# Patient Record
Sex: MALE | Race: WHITE
[De-identification: names, ages, dates, MRNs, and addresses within clinical notes are randomized per-mention and may not be internally consistent; named-entity substitution may affect disease eponyms.]

---

## 2018-12-31 ENCOUNTER — HOSPITAL ENCOUNTER (INPATIENT)
Dept: HOSPITAL 36 - ER | Age: 73
LOS: 4 days | Discharge: NURSING, REHAB OR LONG-TERM CARE | DRG: 689 | End: 2019-01-04
Payer: MEDICARE

## 2018-12-31 DIAGNOSIS — N40.0: ICD-10-CM

## 2018-12-31 DIAGNOSIS — F79: ICD-10-CM

## 2018-12-31 DIAGNOSIS — R13.10: ICD-10-CM

## 2018-12-31 DIAGNOSIS — M48.56XD: ICD-10-CM

## 2018-12-31 DIAGNOSIS — F03.90: ICD-10-CM

## 2018-12-31 DIAGNOSIS — F39: ICD-10-CM

## 2018-12-31 DIAGNOSIS — F41.9: ICD-10-CM

## 2018-12-31 DIAGNOSIS — F29: ICD-10-CM

## 2018-12-31 DIAGNOSIS — N39.0: Primary | ICD-10-CM

## 2018-12-31 DIAGNOSIS — I10: ICD-10-CM

## 2018-12-31 DIAGNOSIS — G93.41: ICD-10-CM

## 2018-12-31 DIAGNOSIS — F32.9: ICD-10-CM

## 2018-12-31 DIAGNOSIS — R00.0: ICD-10-CM

## 2018-12-31 LAB
ALBUMIN SERPL-MCNC: 3.7 GM/DL (ref 4.2–5.5)
ALBUMIN/GLOB SERPL: 1.3 {RATIO} (ref 1–1.8)
ALP SERPL-CCNC: 48 U/L (ref 34–104)
ALT SERPL-CCNC: 11 U/L (ref 7–52)
ANION GAP SERPL CALC-SCNC: 10.5 MMOL/L (ref 7–16)
APPEARANCE UR: CLEAR
AST SERPL-CCNC: 11 U/L (ref 13–39)
BACTERIA #/AREA URNS HPF: (no result) /HPF
BASOPHILS # BLD AUTO: 0 TH/CUMM (ref 0–0.2)
BASOPHILS NFR BLD AUTO: 0.2 % (ref 0–2)
BILIRUB SERPL-MCNC: 0.8 MG/DL (ref 0.3–1)
BILIRUB UR-MCNC: NEGATIVE MG/DL
BUN SERPL-MCNC: 16 MG/DL (ref 7–25)
CALCIUM SERPL-MCNC: 9.2 MG/DL (ref 8.6–10.3)
CHLORIDE SERPL-SCNC: 102 MEQ/L (ref 98–107)
CO2 SERPL-SCNC: 25.3 MEQ/L (ref 21–31)
COLOR UR: YELLOW
CREAT SERPL-MCNC: 0.7 MG/DL (ref 0.7–1.3)
EOSINOPHIL # BLD AUTO: 0.2 TH/CMM (ref 0.1–0.4)
EOSINOPHIL NFR BLD AUTO: 1.4 % (ref 0–5)
EPI CELLS URNS QL MICRO: (no result) /LPF
ERYTHROCYTE [DISTWIDTH] IN BLOOD BY AUTOMATED COUNT: 13.6 % (ref 11.5–20)
GLOBULIN SER-MCNC: 2.9 GM/DL
GLUCOSE SERPL-MCNC: 119 MG/DL (ref 70–105)
GLUCOSE UR STRIP-MCNC: NEGATIVE MG/DL
HCT VFR BLD CALC: 43.7 % (ref 41–60)
HGB BLD-MCNC: 14.5 GM/DL (ref 12–16)
INR PPP: 1 (ref 0.5–1.4)
KETONES UR STRIP-MCNC: NEGATIVE MG/DL
LEUKOCYTE ESTERASE UR-ACNC: NEGATIVE
LYMPHOCYTE AB SER FC-ACNC: 1.8 TH/CMM (ref 1.5–3)
LYMPHOCYTES NFR BLD AUTO: 14.3 % (ref 20–50)
MCH RBC QN AUTO: 28.6 PG (ref 27–31)
MCHC RBC AUTO-ENTMCNC: 33.3 PG (ref 28–36)
MCV RBC AUTO: 86.1 FL (ref 80–99)
MICRO URNS: YES
MONOCYTES # BLD AUTO: 0.9 TH/CMM (ref 0.3–1)
MONOCYTES NFR BLD AUTO: 7.6 % (ref 2–10)
NEUTROPHILS # BLD: 9.4 TH/CMM (ref 1.8–8)
NEUTROPHILS NFR BLD AUTO: 76.5 % (ref 40–80)
NITRITE UR QL STRIP: NEGATIVE
PH UR STRIP: 6.5 [PH] (ref 4.6–8)
PLATELET # BLD: 237 TH/CMM (ref 150–400)
PMV BLD AUTO: 6.6 FL
POTASSIUM SERPL-SCNC: 3.8 MEQ/L (ref 3.5–5.1)
PROT UR STRIP-MCNC: NEGATIVE MG/DL
PROTHROMBIN TIME: 10.4 SECONDS (ref 9.5–11.5)
RBC # BLD AUTO: 5.07 MIL/CMM (ref 3.8–5.8)
RBC # UR STRIP: NEGATIVE /UL
RBC #/AREA URNS HPF: (no result) /HPF (ref 0–5)
SODIUM SERPL-SCNC: 134 MEQ/L (ref 136–145)
SP GR UR STRIP: 1.01 (ref 1–1.03)
URINALYSIS COMPLETE PNL UR: (no result)
UROBILINOGEN UR STRIP-ACNC: 0.2 E.U./DL (ref 0.2–1)
WBC # BLD AUTO: 12.3 TH/CMM (ref 4.8–10.8)
WBC #/AREA URNS HPF: (no result) /HPF (ref 0–5)

## 2018-12-31 PROCEDURE — Z7610: HCPCS

## 2018-12-31 PROCEDURE — X3401: HCPCS

## 2018-12-31 RX ADMIN — DEXTROSE AND SODIUM CHLORIDE SCH MLS/HR: 5; .9 INJECTION, SOLUTION INTRAVENOUS at 23:17

## 2018-12-31 NOTE — HISTORY & PHYSICAL
ADMIT DATE:  12/31/2018



CHIEF COMPLAINT:  Worsening confusion and abdominal pain.



HISTORY OF PRESENT ILLNESS:  The patient is a 73-year-old male admitted from the

Emergency Room to telemetry floor of [facility] due to altered

level of consciousness with dysphagia and acute abdominal pain.  For the

abdominal pain, according to the ER physician, abdominal and pelvic CT scan

revealed no acute disease, but a significant partially enlargement.  However, I

do not have the official report of the CT scan yet.  The patient is very

confused, does not answer questions.  His white count is elevated to 12,300. 

The patient's sodium is 134.  Troponin less than 0.01.  TSH 1.83, which is

normal.  CT scan of the abdomen also revealed L2 compression fracture.



PAST MEDICAL HISTORY:  Including urinary tract infection, dysphagia, abnormal

posture, weakness, dementia, depression, anxiety.



PAST SURGICAL HISTORY:  This cannot be reliably obtained as the patient does not

answer questions.



MEDICATIONS:  See medication reconciliation list.



ALLERGIES:  No known drug allergies.



REVIEW OF SYSTEMS:  Not feasible.



PHYSICAL EXAMINATION:

GENERAL:  Well-developed female in no acute distress.

SKIN:  Warm.

VITAL SIGNS:  Basically stable.

HEENT:  Normocephalic, atraumatic.  Pupils equal, round, react to light and

accommodation.

CHEST:  Symmetrical.

LUNGS:  Clear to auscultation bilaterally.

CARDIAC:  Normal sinus rhythm.

ABDOMEN:  Benign, soft, nontender.

EXTREMITIES:  No clubbing, cyanosis, edema bilaterally, 2+.

NEUROLOGIC:  Unremarkable.



LABORATORY DATA:  Reviewed as seen from the computer.



ASSESSMENT AND PLAN:

1.  Altered level of consciousness:  Probably due to metabolic encephalopathy

and dementia.  We will observe closely.

2.  Abdominal pain:  Etiology is not entirely clear.  We will observe closely.

3.  Leukocytosis:  Blood culture ordered, empiric antibiotics started, which

will be adjusted accordingly.

4.  Dysphagia:  Swallowing evaluation ordered and aspiration precaution.

5.  L2 compression fracture:  Physical therapy.

6.  DVT prophylaxis.





DD: 12/31/2018 21:35

DT: 12/31/2018 21:50

JOB# [id number]  [id number]

## 2018-12-31 NOTE — ED PHYSICIAN CHART
ED Chief Complaint/HPI





- Patient Information


Date Seen:: 12/31/18


Time Seen:: 13:14


Chief Complaint:: abdominal pain


History of Present Illness:: 





this is a 73 yr old male who is here from a nursing home sent here for 

abdominal pain. He is mentally challenged and cannot understand anything that 

is said.


Allergies:: 


 Allergies











Allergy/AdvReac Type Severity Reaction Status Date / Time


 


No Known Allergies Allergy   Verified 12/31/18 13:03











Vitals:: 


 Vital Signs - 8 hr











  12/31/18





  13:03


 


Temp 97.5 F


 


HR 80


 


RR 18


 


/74


 


O2 Sat % 99











Historian:: Medical Records


Review:: Nurse's Note Reviewed, Transfer documents Reviewed





ED Review of Systems





- Review of Systems


General/Constitutional: No fever, No chills, No weight loss, No weakness, No 

diaphoresis, No edema, No loss of appetite, Other (this patient is unable to 

give a review of systems)


Skin: No skin lesions, No rash, No bruising


Head: No headache, No light-headedness


Eyes: No loss of vision, No pain, No diplopia


ENT: No earache, No nasal drainage, No sore throat, No tinnitus


Neck: No neck pain, No swelling, No thyromegaly, No stiffness, No mass noted


Cardio Vascular: No chest pain, No palpitations, No PND, No orthopnea, No edema


Pulmonary: No SOB, No cough, No sputum, No wheezing


GI: No nausea, No vomiting, No diarrhea, No pain, No melena, No hematochezia, 

No constipation, No hematemesis


G/U: No dysuria, No frequency, No hematuria


Musculoskeletal: No bone or joint pain, No back pain, No muscle pain


Endocrine: No polyuria, No polydipsia


Psychiatric: No prior psych history, No depression, No anxiety, No suicidal 

ideation


Hematopoietic: No bruising, No lymphadenopathy


Allergic/Immuno: No urticaria, No angioedema


Neurological: No syncope, No focal symptoms, No weakness, No paresthesia, No 

headache, No seizure, No dizziness, No confusion, No vertigo





ED Past Medical History





- Past Medical History


Obtainable: Yes


Past Medical History: HTN, Dementia


Family History: None


Social History: Non Smoker, No Alcohol, No Drug Use, , Care Facility


Surgical History: None


Psychiatricy History: Depression


Medication: Reviewed





Family Medical History





- Family Member


  ** Mother


History Unknown: Yes





ED Physical Exam





- Physical Examination


General/Constitutional: Awake, Well-developed, well-nourished, Alert, No 

distress, GCS 15, Non-toxic appearing, Ambulatory


Head: Atraumatic


Eyes: Lids, conjuctiva normal, PERRL, EOMI


Skin: Nl inspection, No rash, No skin lesions, No ecchymosis, Well hydrated, No 

lymphadenopathy


ENMT: External ears, nose nl, Nasal exam nl, Lips, teeth, gums nl


Neck: Nontender, Full ROM w/o pain, No JVD, No nuchal rigidity, No bruit, No 

mass, No stridor


Respiratory: Nl effort/Exclusion, Clear to Auscultation, No Wheeze/Rhonchi/Rales


Cardio Vascular: RRR, No murmur, gallop, rubs, NL S1 S2


GI: No tenderness/rebounding/guarding, No organomegaly, No hernia, Normal BS's, 

Nondistended, No mass/bruits, No McBurney tenderness


: No CVA tenderness


Extremities: No tenderness or effusion, Full ROM, normal strength in all 

extremities, No edema, Normal digits & nails


Neuro/Psych: Alert/oriented, DTR's symmetric, Normal sensory exam, Normal motor 

strength, Judgement/insight normal, Mood normal, Normal gait, No focal deficits


Misc: Normal back, No paraspinal tenderness





ED Labs/Radiology/EKG Results





- Lab Results


Results: 





 Laboratory Results - last 24 hr











  12/31/18 12/31/18 12/31/18





  13:00 13:00 13:00


 


WBC   12.3 H 


 


RBC   5.07 


 


Hgb   14.5 


 


Hct   43.7 


 


MCV   86.1 


 


MCH   28.6 


 


MCHC Differential   33.3 


 


RDW   13.6 


 


Plt Count   237 


 


MPV   6.6 


 


Neutrophils %   76.5 


 


Lymphocytes %   14.3 L 


 


Monocytes %   7.6 


 


Eosinophils %   1.4 


 


Basophils %   0.2 


 


PT  10.4  


 


INR  1.00  


 


Sodium    134 L


 


Potassium    3.8


 


Chloride    102


 


Carbon Dioxide    25.3


 


Anion Gap    10.5


 


BUN    16


 


Creatinine    0.7


 


Est GFR ( Amer)    TNP


 


Est GFR (Non-Af Amer)    TNP


 


BUN/Creatinine Ratio    22.9


 


Glucose    119 H


 


Calcium    9.2


 


Total Bilirubin    0.8


 


AST    11 L


 


ALT    11


 


Alkaline Phosphatase    48


 


Troponin I   


 


Total Protein    6.6


 


Albumin    3.7 L


 


Globulin    2.9


 


Albumin/Globulin Ratio    1.3


 


TSH   














  12/31/18 12/31/18





  13:00 13:00


 


WBC  


 


RBC  


 


Hgb  


 


Hct  


 


MCV  


 


MCH  


 


MCHC Differential  


 


RDW  


 


Plt Count  


 


MPV  


 


Neutrophils %  


 


Lymphocytes %  


 


Monocytes %  


 


Eosinophils %  


 


Basophils %  


 


PT  


 


INR  


 


Sodium  


 


Potassium  


 


Chloride  


 


Carbon Dioxide  


 


Anion Gap  


 


BUN  


 


Creatinine  


 


Est GFR ( Amer)  


 


Est GFR (Non-Af Amer)  


 


BUN/Creatinine Ratio  


 


Glucose  


 


Calcium  


 


Total Bilirubin  


 


AST  


 


ALT  


 


Alkaline Phosphatase  


 


Troponin I  < 0.01 L 


 


Total Protein  


 


Albumin  


 


Globulin  


 


Albumin/Globulin Ratio  


 


TSH   1.83














- Radiology Results


Results: 





ct scan of abdomen = severely enlarged prostate gland


                              L2 compression fracture


 chest x-ray  =  nad








- EKG Interpretations


EKG Time:: 13:08


Rate & Rhythm: ratee= 68 ,  sinus


Axis: left axis


Intervals: no ectopy noted





ED Assessment





- Assessment


General Assessment: 





abdominal pain


prostate disease


back pain





ED Septic Shock





- .


Is Septic Shock (SBP<90, OR Lactate>4 mmol\L) present?: No





- <6hrs of presentation:


Vital Signs: 


 Vital Signs - 8 hr











  12/31/18





  13:03


 


Temp 97.5 F


 


HR 80


 


RR 18


 


/74


 


O2 Sat % 99














ED Reassessment (Disposition)





- Reassessment


Reassessment Condition:: Unchanged





- Diagnosis


Diagnosis:: 





acute abdominal pain


dementia


severe prostate disease


L2 compression fracture





- Patient Disposition


Discharge/Transfer:: Acute Care w/in this hosp


Admitting Medical Physician:: Nato Portillo


Condition at Disposition:: Improved

## 2019-01-01 RX ADMIN — Medication SCH EACH: at 16:58

## 2019-01-01 RX ADMIN — Medication SCH: at 12:47

## 2019-01-01 RX ADMIN — DEXTROSE AND SODIUM CHLORIDE SCH MLS/HR: 5; .9 INJECTION, SOLUTION INTRAVENOUS at 15:40

## 2019-01-01 NOTE — DIAGNOSTIC IMAGING REPORT
Exam: CT examination abdomen pelvis.



HISTORY: Abdominal pain



Total DLP equals 586



CTDI equals 10.8



Findings:



Multiple contiguous thin section of the abdomen pelvis obtained from

lower thorax to pubic symphysis without the administration of oral or

intravenous contrast material, no prior studies available comparison.



The study demonstrates a mild motion artifacts.



Liver and spleen are intact.  The gallbladder is normal.  The kidneys

demonstrate no evidence of obstructive uropathy or nephrolithiasis. 

Small vascular calcification right kidney appreciated



Abdominal aorta is calcified.  There is evidence of for enlarged

prostate gland protruding into urinary bladder.  Degenerative changes

lumbar spine with the compression fracture of L2 lumbar vertebra

appreciated most likely chronic.



There is no evidence for lytic or blastic lesions.



IMPRESSION: Large prostate gland protruding to urinary bladder using the

bladder wall is thickened.



Degenerative changes lumbar spine with L2 lumbar vertebra compression.

## 2019-01-01 NOTE — CONSULTATION
DATE OF CONSULTATION:  01/01/2019



HISTORY OF PRESENT ILLNESS:  A 73-year-old male coming in from skilled nursing. 

Apparently mentally challenged, is not really able to understand much. The

patient is not answering any questions, not even his name, does not know where

he is, does not know the year, does not know the month.  Per staff, he is trying

to hit staff, agitated, aggressive, highly impulsive.



PAST PSYCHIATRIC HISTORY:  Developmental disability.



FAMILY HISTORY:  Noncontributory.



SOCIAL HISTORY:  Coming in from skilled nursing.  Daughter involved.



MEDICATIONS:  Noted.



MENTAL STATUS EXAMINATION:  Stated age.  Fair eye contact.  Awake, alert, but

very confused, disoriented, not answering any questions appropriately.  No overt

SI or HI, unclear psychotic symptoms.  Poor impulse control.



PROVISIONAL DIAGNOSES:  Developmental disability.  The patient is on Aricept,

possible dementia. Diagnosis also psychosis, unspecified; mood, unspecified;

anxiety, unspecified.



MEDICAL:  Please see full H and P.



RECOMMENDATIONS AND PLAN:  We will attempt to increase collateral.  The patient

may benefit from dosing of Depakote.  The patient may need Geropsych placement

if behaviors persist.





DD: 01/01/2019 10:34

DT: 01/01/2019 17:23

JOB# [id number]  [id number]

## 2019-01-02 RX ADMIN — Medication SCH TAB: at 09:15

## 2019-01-02 RX ADMIN — DEXTROSE AND SODIUM CHLORIDE SCH MLS/HR: 5; .9 INJECTION, SOLUTION INTRAVENOUS at 18:28

## 2019-01-02 RX ADMIN — DEXTROSE AND SODIUM CHLORIDE SCH MLS/HR: 5; .9 INJECTION, SOLUTION INTRAVENOUS at 04:55

## 2019-01-02 RX ADMIN — Medication SCH EACH: at 09:15

## 2019-01-02 NOTE — INTERNAL MEDICINE PROG NOTE
Internal Medicine Subjective





- Subjective


Service Date: 01/02/19


Patient seen and examined:: without staff


Patient is:: awake, non-verbal, non-interactive, in bed


Per staff patient has:: no adverse event





Internal Medicine Objective





- Results


Result Diagrams: 


 12/31/18 13:00





 12/31/18 13:00


Recent Labs: 


 Laboratory Last Values











WBC  12.3 Th/cmm (4.8-10.8)  H  12/31/18  13:00    


 


RBC  5.07 Mil/cmm (3.80-5.80)   12/31/18  13:00    


 


Hgb  14.5 gm/dL (12-16)   12/31/18  13:00    


 


Hct  43.7 % (41.0-60)   12/31/18  13:00    


 


MCV  86.1 fl (80-99)   12/31/18  13:00    


 


MCH  28.6 pg (27.0-31.0)   12/31/18  13:00    


 


MCHC Differential  33.3 pg (28.0-36.0)   12/31/18  13:00    


 


RDW  13.6 % (11.5-20.0)   12/31/18  13:00    


 


Plt Count  237 Th/cmm (150-400)   12/31/18  13:00    


 


MPV  6.6 fl  12/31/18  13:00    


 


Neutrophils %  76.5 % (40.0-80.0)   12/31/18  13:00    


 


Lymphocytes %  14.3 % (20.0-50.0)  L  12/31/18  13:00    


 


Monocytes %  7.6 % (2.0-10.0)   12/31/18  13:00    


 


Eosinophils %  1.4 % (0.0-5.0)   12/31/18  13:00    


 


Basophils %  0.2 % (0.0-2.0)   12/31/18  13:00    


 


PT  10.4 SECONDS (9.5-11.5)   12/31/18  13:00    


 


INR  1.00  (0.5-1.4)   12/31/18  13:00    


 


Sodium  134 mEq/L (136-145)  L  12/31/18  13:00    


 


Potassium  3.8 mEq/L (3.5-5.1)   12/31/18  13:00    


 


Chloride  102 mEq/L ()   12/31/18  13:00    


 


Carbon Dioxide  25.3 mEq/L (21.0-31.0)   12/31/18  13:00    


 


Anion Gap  10.5  (7.0-16.0)   12/31/18  13:00    


 


BUN  16 mg/dL (7-25)   12/31/18  13:00    


 


Creatinine  0.7 mg/dL (0.7-1.3)   12/31/18  13:00    


 


Est GFR ( Amer)  TNP   12/31/18  13:00    


 


Est GFR (Non-Af Amer)  TNP   12/31/18  13:00    


 


BUN/Creatinine Ratio  22.9   12/31/18  13:00    


 


Glucose  119 mg/dL ()  H  12/31/18  13:00    


 


Calcium  9.2 mg/dL (8.6-10.3)   12/31/18  13:00    


 


Total Bilirubin  0.8 mg/dL (0.3-1.0)   12/31/18  13:00    


 


AST  11 U/L (13-39)  L  12/31/18  13:00    


 


ALT  11 U/L (7-52)   12/31/18  13:00    


 


Alkaline Phosphatase  48 U/L ()   12/31/18  13:00    


 


Troponin I  < 0.01 ng/mL (0.01-0.05)  L  12/31/18  13:00    


 


Total Protein  6.6 gm/dL (6.0-8.3)   12/31/18  13:00    


 


Albumin  3.7 gm/dL (4.2-5.5)  L  12/31/18  13:00    


 


Globulin  2.9 gm/dL  12/31/18  13:00    


 


Albumin/Globulin Ratio  1.3  (1.0-1.8)   12/31/18  13:00    


 


TSH  1.83 uIU/ml (0.34-5.60)   12/31/18  13:00    


 


Urine Source  CLEAN C   12/31/18  15:30    


 


Urine Color  YELLOW   12/31/18  15:30    


 


Urine Clarity  CLEAR  (CLEAR)   12/31/18  15:30    


 


Urine pH  6.5  (4.6 - 8.0)   12/31/18  15:30    


 


Ur Specific Gravity  1.010  (1.005-1.030)   12/31/18  15:30    


 


Urine Protein  NEGATIVE mg/dL (NEGATIVE)   12/31/18  15:30    


 


Urine Glucose (UA)  NEGATIVE mg/dL (NEGATIVE)   12/31/18  15:30    


 


Urine Ketones  NEGATIVE mg/dL (NEGATIVE)   12/31/18  15:30    


 


Urine Blood  NEGATIVE  (NEGATIVE)   12/31/18  15:30    


 


Urine Nitrate  NEGATIVE  (NEGATIVE)   12/31/18  15:30    


 


Urine Bilirubin  NEGATIVE  (NEGATIVE)   12/31/18  15:30    


 


Urine Urobilinogen  0.2 E.U./dL (0.2 - 1.0)   12/31/18  15:30    


 


Ur Leukocyte Esterase  NEGATIVE  (NEGATIVE)   12/31/18  15:30    


 


Urine RBC  0-2 /hpf (0-5)  H  12/31/18  15:30    


 


Urine WBC  2-5 /hpf (0-5)   12/31/18  15:30    


 


Ur Epithelial Cells  FEW /lpf (FEW)   12/31/18  15:30    


 


Urine Bacteria  FEW /hpf (NONE SEEN)   12/31/18  15:30    














- Physical Exam


Vitals and I&O: 


 Vital Signs











Temp  97.9 F   01/02/19 04:00


 


Pulse  69   01/02/19 04:00


 


Resp  18   01/02/19 04:00


 


BP  134/68   01/02/19 04:00


 


Pulse Ox  96   01/02/19 04:00








 Intake & Output











 01/01/19 01/02/19 01/02/19





 18:59 06:59 18:59


 


Intake Total 1000 1720 


 


Balance 1000 1720 


 


Weight (lbs)  70.806 kg 


 


Intake:   


 


  Intake, IV Amount 1000 1000 


 


    D5-0.9%Ns 1,000 ml @ 80 1000 1000 





    mls/hr IV .L78C06C Count includes the [facility] Rx   





    #:[id number]   


 


  Oral  720 


 


Other:   


 


  # Voids  1 


 


  # Bowel Movements  1 


 


  Stool Characteristics Liquid  





 Black  


 


  Weight Source  Bedscale 











Active Medications: 


Current Medications





Acetaminophen (Tylenol)  650 mg PO Q4HR PRN


   PRN Reason: Pain or Fever >101


   Stop: 03/01/19 21:26


   Last Admin: 01/01/19 20:23 Dose:  650 mg


Acetaminophen (Tylenol Extra Strength)  1,000 mg PO Q4HR PRN


   PRN Reason: Pain (Moderate) 4-7


   Stop: 03/01/19 21:26


Benztropine Mesylate (Cogentin)  0.5 mg PO BID Count includes the [facility]


   Stop: 03/02/19 08:59


   Last Admin: 01/02/19 09:15 Dose:  0.5 mg


Bisacodyl (Dulcolax 10 Mg Supp)  10 mg RC DAILY PRN


   PRN Reason: Constipation


   Stop: 03/01/19 21:26


Clonazepam (Klonopin)  0.5 mg PO HS Count includes the [facility]; Protocol


   Stop: 03/02/19 20:59


   Last Admin: 01/01/19 20:23 Dose:  0.5 mg


Docusate Sodium (Colace)  250 mg PO DAILY DARNELL


   Stop: 03/02/19 08:59


   Last Admin: 01/01/19 12:46 Dose:  Not Given


Donepezil HCl (Aricept)  5 mg PO Q12HR DARNELL


   Stop: 03/01/19 21:59


   Last Admin: 01/02/19 09:15 Dose:  5 mg


Heparin Sodium (Porcine) (Heparin)  5,000 units SUBQ Q12HR DARNELL


   Stop: 03/02/19 20:59


   Last Admin: 01/02/19 09:15 Dose:  5,000 units


Dextrose/Sodium Chloride (D5-0.9%Ns)  1,000 mls @ 80 mls/hr IV .L26C36O Count includes the [facility]


   Stop: 03/01/19 21:59


   Last Admin: 01/02/19 04:55 Dose:  80 mls/hr


Ceftriaxone Sodium 1 gm/ (Dextrose)  50 mls @ 100 mls/hr IV Q24HR Count includes the [facility]


   Stop: 03/02/19 12:59


   Last Admin: 01/01/19 13:02 Dose:  100 mls/hr


Influenza Virus Vaccine (Flu Ad)  0.5 ml IM .ONCE ONE


   Stop: 01/02/19 14:01


Lactobacillus Rhamnosus (Culturelle 15b)  1 each PO DAILY Count includes the [facility]


   Stop: 03/02/19 15:59


   Last Admin: 01/02/19 09:15 Dose:  1 each


Lorazepam (Ativan)  0.5 mg IVP Q6H PRN; Protocol


   PRN Reason: Agitation


   Stop: 03/02/19 12:09


   Last Admin: 01/02/19 09:52 Dose:  0.5 mg


Magnesium Hydroxide (Milk Of Magnesia)  30 ml PO HS DARNELL


   Stop: 03/02/19 20:59


   Last Admin: 01/01/19 20:28 Dose:  Not Given


Megestrol Acetate (Megace)  400 mg PO BID Count includes the [facility]


   Stop: 03/02/19 08:59


   Last Admin: 01/01/19 16:56 Dose:  400 mg


Memantine (Namenda)  5 mg PO Q12HR Count includes the [facility]


   Stop: 03/01/19 21:59


   Last Admin: 01/02/19 09:15 Dose:  5 mg


Mirtazapine (Remeron)   mg PO HS Count includes the [facility]; Protocol


   Stop: 03/02/19 20:59


Miscellaneous (Vte Chemical Prophylaxis Screen/ Admission)  1 ea MC PRN PRN


   PRN Reason: PROTOCOL


   Stop: 03/02/19 14:26


Miscellaneous (Probiotic Screen)  1 ea MC PRN PRN


   PRN Reason: PROTOCOL


   Stop: 03/02/19 15:31


Psyllium Hydrophilic Mucilloid (Metamucil)  1 pkt PO BID Count includes the [facility]


   Stop: 03/02/19 08:59


   Last Admin: 01/01/19 16:56 Dose:  1 pkt


Sodium Phosphate (Fleet Enema)  135 ml RC Q48H PRN


   PRN Reason: Constipation


   Stop: 03/01/19 21:26








General: weak, lethargic, demented


HEENT: NC/AT, PERRLA, anicteric sclerae


Neck: Supple, No JVD


Lungs: CTAB, congested


Cardiovascular: RRR, Normal S1, Normal S2


Abdomen: soft, non-distended


Extremities: clear





Internal Medicine Assmt/Plan





- Assessment


Assessment: 





ALOC: observe for now.





Leukocystosis: etiology? repeat CBC





Abd pain: better.





Enlarged prostate on CT: PSA ordered.





Agitation: observe





Psychosis: adjusting meds.





Dysphagia: ST and aspiration evaluation.

## 2019-01-03 LAB
ALBUMIN SERPL-MCNC: 2.7 GM/DL (ref 4.2–5.5)
ALBUMIN/GLOB SERPL: 0.9 {RATIO} (ref 1–1.8)
ALP SERPL-CCNC: 48 U/L (ref 34–104)
ALT SERPL-CCNC: 20 U/L (ref 7–52)
ANION GAP SERPL CALC-SCNC: 10.5 MMOL/L (ref 7–16)
AST SERPL-CCNC: 15 U/L (ref 13–39)
BASOPHILS # BLD AUTO: 0 TH/CUMM (ref 0–0.2)
BASOPHILS NFR BLD AUTO: 0.3 % (ref 0–2)
BILIRUB SERPL-MCNC: 1 MG/DL (ref 0.3–1)
BUN SERPL-MCNC: 5 MG/DL (ref 7–25)
CALCIUM SERPL-MCNC: 8.3 MG/DL (ref 8.6–10.3)
CHLORIDE SERPL-SCNC: 106 MEQ/L (ref 98–107)
CO2 SERPL-SCNC: 22 MEQ/L (ref 21–31)
CREAT SERPL-MCNC: 0.6 MG/DL (ref 0.7–1.3)
EOSINOPHIL # BLD AUTO: 0.6 TH/CMM (ref 0.1–0.4)
EOSINOPHIL NFR BLD AUTO: 5.6 % (ref 0–5)
ERYTHROCYTE [DISTWIDTH] IN BLOOD BY AUTOMATED COUNT: 13.2 % (ref 11.5–20)
GLOBULIN SER-MCNC: 3.1 GM/DL
GLUCOSE SERPL-MCNC: 125 MG/DL (ref 70–105)
HCT VFR BLD CALC: 39.6 % (ref 41–60)
HGB BLD-MCNC: 13.4 GM/DL (ref 12–16)
LYMPHOCYTE AB SER FC-ACNC: 1 TH/CMM (ref 1.5–3)
LYMPHOCYTES NFR BLD AUTO: 9.2 % (ref 20–50)
MCH RBC QN AUTO: 28.9 PG (ref 27–31)
MCHC RBC AUTO-ENTMCNC: 33.8 PG (ref 28–36)
MCV RBC AUTO: 85.5 FL (ref 80–99)
MONOCYTES # BLD AUTO: 1 TH/CMM (ref 0.3–1)
MONOCYTES NFR BLD AUTO: 9.4 % (ref 2–10)
NEUTROPHILS # BLD: 8.1 TH/CMM (ref 1.8–8)
NEUTROPHILS NFR BLD AUTO: 75.5 % (ref 40–80)
PLATELET # BLD: 213 TH/CMM (ref 150–400)
PMV BLD AUTO: 6.8 FL
POTASSIUM SERPL-SCNC: 3.5 MEQ/L (ref 3.5–5.1)
RBC # BLD AUTO: 4.63 MIL/CMM (ref 3.8–5.8)
SODIUM SERPL-SCNC: 135 MEQ/L (ref 136–145)
WBC # BLD AUTO: 10.7 TH/CMM (ref 4.8–10.8)

## 2019-01-03 RX ADMIN — Medication SCH EACH: at 09:35

## 2019-01-03 RX ADMIN — DILTIAZEM HYDROCHLORIDE SCH MG: 30 TABLET, FILM COATED ORAL at 12:49

## 2019-01-03 RX ADMIN — DEXTROSE AND SODIUM CHLORIDE SCH MLS/HR: 5; .9 INJECTION, SOLUTION INTRAVENOUS at 06:52

## 2019-01-03 RX ADMIN — DILTIAZEM HYDROCHLORIDE SCH MG: 30 TABLET, FILM COATED ORAL at 17:39

## 2019-01-03 RX ADMIN — DILTIAZEM HYDROCHLORIDE SCH: 30 TABLET, FILM COATED ORAL at 07:11

## 2019-01-03 RX ADMIN — DEXTROSE AND SODIUM CHLORIDE SCH MLS/HR: 5; .9 INJECTION, SOLUTION INTRAVENOUS at 21:49

## 2019-01-03 RX ADMIN — Medication SCH TAB: at 09:35

## 2019-01-03 NOTE — CONSULTATION
DATE OF CONSULTATION:  01/02/2019



HISTORY OF PRESENT ILLNESS:  A 73-year-old male coming from skilled nursing

[facility] with developmental disability, unable to even tell me his name.  Per

staff, noted to be restless, ongoing safety concerns.  Per , the

patient coming from [place].  The patient still needing restraints at times,

still unruly, not following unit rules or directions.  The patient biting and

pulling IV line.



PAST PSYCHIATRIC HISTORY:  Noted.



MENTAL STATUS EXAMINATION:  Stated age, AO to zero, restless, unruly, still

anxious, agitated, trying to bite staff.



Medications were noted.



PROVISIONAL DIAGNOSIS:  No change.  Developmental disability; mood, unspecified;

anxiety, unspecified; psychosis, unspecified.



RECOMMENDATIONS AND PLAN:  We will attempt to reach out to daughter.  We will

attempt to increase collateral.  Clarify diagnosis.  It is unclear if the

differential is correct perhaps possibly just dementia with behaviors.  It is

unclear if developmental disability is accurate.  The patient may need transfer

to [facility].





DD: 01/02/2019 11:45

DT: 01/03/2019 00:46

[facility]# [id number]  [id number]

## 2019-01-03 NOTE — INTERNAL MEDICINE PROG NOTE
Internal Medicine Subjective





- Subjective


Service Date: 01/03/19


Patient seen and examined:: without staff


Patient is:: awake, non-verbal, non-interactive, in bed


Per staff patient has:: no adverse event





Internal Medicine Objective





- Results


Result Diagrams: 


 01/03/19 05:45





 01/03/19 05:45


Recent Labs: 


 Laboratory Last Values











WBC  10.7 Th/cmm (4.8-10.8)   01/03/19  05:45    


 


RBC  4.63 Mil/cmm (3.80-5.80)   01/03/19  05:45    


 


Hgb  13.4 gm/dL (12-16)   01/03/19  05:45    


 


Hct  39.6 % (41.0-60)  L  01/03/19  05:45    


 


MCV  85.5 fl (80-99)   01/03/19  05:45    


 


MCH  28.9 pg (27.0-31.0)   01/03/19  05:45    


 


MCHC Differential  33.8 pg (28.0-36.0)   01/03/19  05:45    


 


RDW  13.2 % (11.5-20.0)   01/03/19  05:45    


 


Plt Count  213 Th/cmm (150-400)   01/03/19  05:45    


 


MPV  6.8 fl  01/03/19  05:45    


 


Neutrophils %  75.5 % (40.0-80.0)   01/03/19  05:45    


 


Lymphocytes %  9.2 % (20.0-50.0)  L  01/03/19  05:45    


 


Monocytes %  9.4 % (2.0-10.0)   01/03/19  05:45    


 


Eosinophils %  5.6 % (0.0-5.0)  H  01/03/19  05:45    


 


Basophils %  0.3 % (0.0-2.0)   01/03/19  05:45    


 


PT  10.4 SECONDS (9.5-11.5)   12/31/18  13:00    


 


INR  1.00  (0.5-1.4)   12/31/18  13:00    


 


Sodium  135 mEq/L (136-145)  L  01/03/19  05:45    


 


Potassium  3.5 mEq/L (3.5-5.1)   01/03/19  05:45    


 


Chloride  106 mEq/L ()   01/03/19  05:45    


 


Carbon Dioxide  22.0 mEq/L (21.0-31.0)   01/03/19  05:45    


 


Anion Gap  10.5  (7.0-16.0)   01/03/19  05:45    


 


BUN  5 mg/dL (7-25)  L  01/03/19  05:45    


 


Creatinine  0.6 mg/dL (0.7-1.3)  L  01/03/19  05:45    


 


Est GFR ( Amer)  TNP   01/03/19  05:45    


 


Est GFR (Non-Af Amer)  TNP   01/03/19  05:45    


 


BUN/Creatinine Ratio  8.3   01/03/19  05:45    


 


Glucose  125 mg/dL ()  H  01/03/19  05:45    


 


Calcium  8.3 mg/dL (8.6-10.3)  L  01/03/19  05:45    


 


Magnesium  1.9 mg/dL (1.9-2.7)   01/02/19  10:30    


 


Total Bilirubin  1.0 mg/dL (0.3-1.0)   01/03/19  05:45    


 


AST  15 U/L (13-39)   01/03/19  05:45    


 


ALT  20 U/L (7-52)   01/03/19  05:45    


 


Alkaline Phosphatase  48 U/L ()   01/03/19  05:45    


 


Troponin I  < 0.01 ng/mL (0.01-0.05)  L  12/31/18  13:00    


 


Total Protein  5.8 gm/dL (6.0-8.3)  L  01/03/19  05:45    


 


Albumin  2.7 gm/dL (4.2-5.5)  L  01/03/19  05:45    


 


Globulin  3.1 gm/dL  01/03/19  05:45    


 


Albumin/Globulin Ratio  0.9  (1.0-1.8)  L  01/03/19  05:45    


 


Carcinoembryonic Ag  2.2 ng/mL (0.0-4.7)   12/31/18  13:00    


 


Prostate Specific Ag  2.2 ng/mL (0.0-4.0)   12/31/18  13:00    


 


TSH  1.83 uIU/ml (0.34-5.60)   12/31/18  13:00    


 


Urine Source  CLEAN C   12/31/18  15:30    


 


Urine Color  YELLOW   12/31/18  15:30    


 


Urine Clarity  CLEAR  (CLEAR)   12/31/18  15:30    


 


Urine pH  6.5  (4.6 - 8.0)   12/31/18  15:30    


 


Ur Specific Gravity  1.010  (1.005-1.030)   12/31/18  15:30    


 


Urine Protein  NEGATIVE mg/dL (NEGATIVE)   12/31/18  15:30    


 


Urine Glucose (UA)  NEGATIVE mg/dL (NEGATIVE)   12/31/18  15:30    


 


Urine Ketones  NEGATIVE mg/dL (NEGATIVE)   12/31/18  15:30    


 


Urine Blood  NEGATIVE  (NEGATIVE)   12/31/18  15:30    


 


Urine Nitrate  NEGATIVE  (NEGATIVE)   12/31/18  15:30    


 


Urine Bilirubin  NEGATIVE  (NEGATIVE)   12/31/18  15:30    


 


Urine Urobilinogen  0.2 E.U./dL (0.2 - 1.0)   12/31/18  15:30    


 


Ur Leukocyte Esterase  NEGATIVE  (NEGATIVE)   12/31/18  15:30    


 


Urine RBC  0-2 /hpf (0-5)  H  12/31/18  15:30    


 


Urine WBC  2-5 /hpf (0-5)   12/31/18  15:30    


 


Ur Epithelial Cells  FEW /lpf (FEW)   12/31/18  15:30    


 


Urine Bacteria  FEW /hpf (NONE SEEN)   12/31/18  15:30    














- Physical Exam


Vitals and I&O: 


 Vital Signs











Temp  99.1 F   01/03/19 20:00


 


Pulse  89   01/03/19 20:00


 


Resp  18   01/03/19 20:00


 


BP  103/63   01/03/19 20:00


 


Pulse Ox  99   01/03/19 20:00








 Intake & Output











 01/03/19 01/03/19 01/04/19





 06:59 18:59 06:59


 


Intake Total 650 550 606.667


 


Balance 650 550 606.667


 


Weight (lbs) 71.214 kg 70.987 kg 


 


Intake:   


 


  Intake, IV Amount 50  606.667


 


    D5-0.9%Ns 1,000 ml @ 50   606.667





    mls/hr IV .Q20H [facility] Rx#:   





    [id number]   


 


    cefTRIAXone 1 gm In 50  





    Dextrose 5% 50 ml @ 100   





    mls/hr IV Q24HR [facility] Rx#:   





    [id number]   


 


  Oral 600 550 


 


Other:   


 


  # Voids 2 3 


 


  # Bowel Movements 1 1 


 


  Weight Source Bedscale Bedscale 











Active Medications: 


Current Medications





Acetaminophen (Tylenol)  650 mg PO Q4H PRN


   PRN Reason: Fever > 101


   Stop: 03/31/19 23:16


Acetaminophen (Tylenol Extra Strength)  1,000 mg PO Q4H PRN


   PRN Reason: Pain (Moderate)


   Stop: 03/03/19 23:25


Benztropine Mesylate (Cogentin)  0.5 mg PO BID [facility]


   Stop: 03/04/19 08:59


   Last Admin: 01/03/19 17:39 Dose:  0.5 mg


Bisacodyl (Dulcolax 10 Mg Supp)  10 mg RC DAILY PRN


   PRN Reason: Constipation


   Stop: 03/03/19 23:40


Clonazepam (Klonopin)  0.5 mg PO HS PRN; Protocol


   PRN Reason: anxity


   Stop: 03/03/19 23:48


Diltiazem HCl (Cardizem)  60 mg PO Q6HR [facility]


   Stop: 03/04/19 05:59


   Last Admin: 01/03/19 17:39 Dose:  60 mg


Docusate Sodium (Colace)  250 mg PO DAILY [facility]


   Stop: 03/04/19 08:59


   Last Admin: 01/03/19 09:37 Dose:  Not Given


Donepezil HCl (Aricept)  5 mg PO DAILY [facility]


   Stop: 03/04/19 08:59


   Last Admin: 01/03/19 09:36 Dose:  5 mg


Heparin Sodium (Porcine) (Heparin)  5,000 units SUBQ Q12HR [facility]


   Stop: 03/04/19 08:59


   Last Admin: 01/03/19 21:49 Dose:  5,000 units


Ceftriaxone Sodium 1 gm/ (Dextrose)  50 mls @ 100 mls/hr IV Q24H [facility]


   Stop: 03/04/19 12:59


   Last Admin: 01/03/19 15:07 Dose:  100 mls/hr


Dextrose/Sodium Chloride (D5-0.9%Ns)  1,000 mls @ 50 mls/hr IV .Q20H [facility]


   Stop: 03/03/19 23:56


   Last Admin: 01/03/19 21:49 Dose:  50 mls/hr


Lactobacillus Rhamnosus (Culturelle 15b)  1 each PO DAILY [facility]


   Stop: 03/04/19 08:59


   Last Admin: 01/03/19 09:35 Dose:  1 each


Lorazepam (Ativan)  0.5 mg IVP Q6HR PRN; Protocol


   PRN Reason: Agitation


   Stop: 03/04/19 00:19


Magnesium Hydroxide (Milk Of Magnesia)  30 ml PO HS PRN


   PRN Reason: Constipation


   Stop: 03/04/19 00:21


Megestrol Acetate (Megace)  400 mg PO BID [facility]; Protocol


   Stop: 03/04/19 08:59


   Last Admin: 01/03/19 18:45 Dose:  400 mg


Memantine (Namenda)  5 mg PO DAILY [facility]


   Stop: 03/04/19 08:59


   Last Admin: 01/03/19 09:36 Dose:  5 mg


Mirtazapine (Remeron)  15 mg PO HS [facility]; Protocol


   Stop: 03/04/19 20:59


   Last Admin: 01/03/19 21:48 Dose:  15 mg


Psyllium Hydrophilic Mucilloid (Metamucil)  1 pkt PO Q12HR [facility]


   Stop: 03/04/19 08:59


   Last Admin: 01/03/19 21:48 Dose:  1 pkt


Sodium Phosphate (Fleet Enema)  135 ml RC Q48HR PRN


   PRN Reason: Constipation


   Stop: 03/04/19 00:15








General: weak, lethargic, demented


HEENT: NC/AT, PERRLA, anicteric sclerae


Neck: Supple, No JVD


Lungs: CTAB, congested


Cardiovascular: RRR, Normal S1, Normal S2


Abdomen: soft, non-distended


Extremities: clear





Internal Medicine Assmt/Plan





- Assessment


Assessment: 


Tachycardia: Tele monitoring; TSH normal.





ALOC: observe for now.





Leukocystosis: etiology? repeat CBC





Abd pain: better.





Enlarged prostate on CT: PSA ordered.





Agitation: observe





Psychosis: adjusting meds.





Dysphagia: ST and aspiration evaluation.

## 2019-01-03 NOTE — PROGRESS NOTES
DATE:  01/03/2019



SUBJECTIVE:  Case was discussed with staff of the patient, reviewed records. 

This is a 73-year-old male who was admitted on 12/31/2018 from nursing facility.

 He developmentally disabled, unable to understand much.  He is unable to answer

questions.  The patient cannot even tell his name.  Continues to have poor

insight, unpredictable and impulsive.  The patient may be demented, if the

patient needs further treatment the patient may go to [facility] if any acting

out behavior and at this point, he is currently on Cogentin 0.5 mg twice a day

and Klonopin 0.5 mg at bedtime as needed and Aricept 5 mg twice a day and

Namenda 5 mg daily, Remeron 15 mg at bedtime.  The patient may continue

medication as mentioned, if he needs any further help he could go to [facility].



Thank you very much for allowing me to participate in the care of this patient.





DD: 01/03/2019 13:04

DT: 01/03/2019 23:37

JOB# [id number]  [id number]

## 2019-01-03 NOTE — CONSULTATION
DATE OF CONSULTATION:  01/03/2019



The patient of Dr. Portillo.



HISTORY OF PRESENT ILLNESS:  This is a 73-year-old male patient who had been

complaining of abdominal pain, severe.  Following this, the patient was seen in

the Emergency Room.  The patient had a CT scan, which showed compression

fracture of L2.  The patient is admitted.  Cardiac consult is requested.



PAST MEDICAL HISTORY:  Dementia, BPH, urinary tract infection, psychosis, and

dysphagia.



FAMILY HISTORY:  Unremarkable.



SOCIAL HISTORY:  No history of smoking, alcohol abuse.



ALLERGIES:  None.



PHYSICAL EXAMINATION:

VITAL SIGNS:  Blood pressure 130/80, pulse 70, and respirations 20.

HEAD:  Normocephalic.  No lumps or bumps.

EYES:  Pupils equal, reactive to light.  Fundi show AV nicking, sclerae white,

conjunctivae pink.

NECK:  Carotid 2+.  Normal upstroke.  JVD flat.  Thyroid not palpable.  Lymph

nodes not palpable.

CHEST:  Shows increased AP diameter.  No kyphosis, scoliosis.

LUNGS:  Bilateral bronchovesicular breath sounds.

HEART:  PMI fifth intercostal space with lateral to midclavicular line.  S1, S2.

 No S3, S4, soft systolic murmur.

ABDOMEN:  Soft.  Liver, spleen not palpable.  No organomegaly.  Bowel sounds

active.

NEUROLOGIC:  Unremarkable.

EXTREMITIES:  Peripheral pulses 2+.  No pedal edema.



CLINICAL IMPRESSION:  L2 compression fracture, benign prostatic hypertrophy,

urinary tract infection, dementia, psychosis, and dysphagia.



PLAN:  The patient to continue present management and monitor the patient.





DD: 01/03/2019 12:33

DT: 01/03/2019 15:35

[facility]# [id number]  [id number]

## 2019-01-04 RX ADMIN — Medication SCH EACH: at 09:39

## 2019-01-04 RX ADMIN — DILTIAZEM HYDROCHLORIDE SCH MG: 30 TABLET, FILM COATED ORAL at 00:10

## 2019-01-04 RX ADMIN — DILTIAZEM HYDROCHLORIDE SCH MG: 30 TABLET, FILM COATED ORAL at 06:21

## 2019-01-04 RX ADMIN — DILTIAZEM HYDROCHLORIDE SCH MG: 30 TABLET, FILM COATED ORAL at 17:23

## 2019-01-04 RX ADMIN — DILTIAZEM HYDROCHLORIDE SCH MG: 30 TABLET, FILM COATED ORAL at 12:27

## 2019-01-04 RX ADMIN — Medication SCH TAB: at 09:39

## 2019-01-04 NOTE — PROGRESS NOTES
DATE:  01/04/2019



Case was discussed with staff of the patient.  The patient continues to be

confused, demented.  He has mittens on because he was pulling his IV.  Continues

to be unpredictable and impulsive, needing redirections.  He is compliant with

the medication with no side effects, no sedation or nausea.  He is on Remeron 50

mg at bedtime, Namenda 5 mg daily and the patient ____ acting out, he may want

us to send him to [facility]; however, he is acting well, he can go back to the

place where he was at.



Thank you very much for allowing me to participate in the care of this most

interesting gentleman.





DD: 01/04/2019 13:14

DT: 01/04/2019 14:39

JOB# [id number]  [id number]

## 2019-01-09 NOTE — DISCHARGE SUMMARY
DATE OF DISCHARGE:  01/04/2019



FINAL DIAGNOSES:

1.  Abdominal pain, resolved.

2.  Leukocytosis, resolved.

3.  Altered level of consciousness improved.

4.  Agitation, stabilized.

5.  Dysphagia, passes swallow evaluation.

6.  Enlarged prostate with normal PSA.



HOSPITAL COURSE:  The patient is a 73-year-old male admitted due to abdominal

pain with leukocytosis, agitation and confusion.  The patient also had L2

compression fracture on the abdominal and pelvic CT scan, which I think it was

just an incidental finding.  On the CT scan, there was also a large prostate

protruding urinary bladder.  However, PSA is normal and Urology consultation was

requested, but did not get a chance to come to see the patient prior to being

discharged.  The patient's overall condition stabilized and he was accepted back

to nursing home.



DISCHARGE CONDITION:  Stable.



DISPOSITION:  [facility].



DISCHARGE MEDICATIONS:  Continue medication from here.



DIET:  Cardiac, mechanical soft diet.



ACTIVITY:  Bed rest with physical therapy.



FOLLOWUP:  Follow up as soon as possible with urologist who did not get a chance

to see the patient for the enlarged prostate.





DD: 01/09/2019 11:12

DT: 01/09/2019 15:51

JOB# [id number]  [id number]